# Patient Record
Sex: FEMALE | Race: WHITE | NOT HISPANIC OR LATINO | URBAN - METROPOLITAN AREA
[De-identification: names, ages, dates, MRNs, and addresses within clinical notes are randomized per-mention and may not be internally consistent; named-entity substitution may affect disease eponyms.]

---

## 2020-09-07 ENCOUNTER — EMERGENCY (EMERGENCY)
Facility: HOSPITAL | Age: 30
LOS: 1 days | End: 2020-09-07
Admitting: EMERGENCY MEDICINE
Payer: MEDICAID

## 2020-09-07 PROCEDURE — 93010 ELECTROCARDIOGRAM REPORT: CPT

## 2020-09-07 PROCEDURE — 99285 EMERGENCY DEPT VISIT HI MDM: CPT

## 2020-09-08 PROCEDURE — 71045 X-RAY EXAM CHEST 1 VIEW: CPT | Mod: 26,76

## 2022-02-09 PROBLEM — Z00.00 ENCOUNTER FOR PREVENTIVE HEALTH EXAMINATION: Status: ACTIVE | Noted: 2022-02-09

## 2022-02-10 ENCOUNTER — NON-APPOINTMENT (OUTPATIENT)
Age: 32
End: 2022-02-10

## 2022-02-10 ENCOUNTER — APPOINTMENT (OUTPATIENT)
Dept: CARDIOLOGY | Facility: CLINIC | Age: 32
End: 2022-02-10
Payer: MEDICAID

## 2022-02-10 VITALS
HEIGHT: 66 IN | TEMPERATURE: 98.2 F | WEIGHT: 155 LBS | HEART RATE: 94 BPM | DIASTOLIC BLOOD PRESSURE: 76 MMHG | BODY MASS INDEX: 24.91 KG/M2 | OXYGEN SATURATION: 98 % | SYSTOLIC BLOOD PRESSURE: 110 MMHG

## 2022-02-10 VITALS — SYSTOLIC BLOOD PRESSURE: 112 MMHG | DIASTOLIC BLOOD PRESSURE: 70 MMHG

## 2022-02-10 DIAGNOSIS — Z78.9 OTHER SPECIFIED HEALTH STATUS: ICD-10-CM

## 2022-02-10 DIAGNOSIS — Z87.59 PERSONAL HISTORY OF OTHER COMPLICATIONS OF PREGNANCY, CHILDBIRTH AND THE PUERPERIUM: ICD-10-CM

## 2022-02-10 DIAGNOSIS — U07.1 COVID-19: ICD-10-CM

## 2022-02-10 DIAGNOSIS — R94.31 ABNORMAL ELECTROCARDIOGRAM [ECG] [EKG]: ICD-10-CM

## 2022-02-10 DIAGNOSIS — R06.02 SHORTNESS OF BREATH: ICD-10-CM

## 2022-02-10 DIAGNOSIS — R07.89 OTHER CHEST PAIN: ICD-10-CM

## 2022-02-10 DIAGNOSIS — B88.2 OTHER ARTHROPOD INFESTATIONS: ICD-10-CM

## 2022-02-10 DIAGNOSIS — Z72.3 LACK OF PHYSICAL EXERCISE: ICD-10-CM

## 2022-02-10 PROCEDURE — 99205 OFFICE O/P NEW HI 60 MIN: CPT

## 2022-02-10 NOTE — REVIEW OF SYSTEMS
[SOB] : shortness of breath [Chest Discomfort] : chest discomfort [Anxiety] : anxiety [Negative] : Heme/Lymph

## 2022-02-10 NOTE — REASON FOR VISIT
[FreeTextEntry1] : Kun is a pleasant 31-year-old female complains of intermittent chest pains and shortness of breath she had miscarriage 4 months ago.  Since then, she has not been feeling well.  For the past few months, she has intermittent chest discomfort with or without exertion and wearing a location from right parasternal, retrosternal or left parasternal regions\par \par She also has a sensation of insufficient air which is relieved with deep breathing and sighing.  She does not have PND, orthopnea, pedal edema.\par \par She admits to being under stress and also a new job at the bank.  She is afebrile.  Does not have any rash. \par \par EKG in the office today has new inverted T waves compared to 1/31/2022.  Currently the office she does not have any chest discomfort but she had some yesterday.

## 2022-02-10 NOTE — DISCUSSION/SUMMARY
[FreeTextEntry1] : Patient with stress, anxiety and atypical chest pains of varying location for the past few months.  She also has shortness of breath at rest on random occasions relieved with few deep breaths and sighing.\par \par Further evaluation with echocardiogram and CTA of coronaries should be undertaken.  The patient insists that there is no chance of her being pregnant and understands the importance of this due to radiation involved in CT scan.  Also discussed other diagnostic options such as nuclear stress test.\par \par Various typical atypical forms of angina were discussed.  If any persistent chest discomfort, she should call 911 and go to the ER stat.  Based on her presentation and duration of symptoms, the patient is extremely unlikely to have ACS.  Clinically, she is unlikely to have pericarditis.\par \par Patient already had CMP, CBC, TSH, rheumatoid factor and ESR tested -unremarkable except ehrlichiosis for which she was treated with antibiotics.\par \par Patient has borderline sinus tachycardia with anxiety episodes.\par \par Follow-up after above tests\par \par Thank you for this referral and allowing me to participate in the care of this patient.  If I can be of any further help or  if you have any questions, please do not hesitate to contact me\par \par \par Sincerely,\par \par Arthur Leigh MD, FACC, MEGHANN

## 2022-02-15 ENCOUNTER — APPOINTMENT (OUTPATIENT)
Dept: CARDIOLOGY | Facility: CLINIC | Age: 32
End: 2022-02-15